# Patient Record
Sex: FEMALE | Race: WHITE | NOT HISPANIC OR LATINO | Employment: PART TIME | URBAN - METROPOLITAN AREA
[De-identification: names, ages, dates, MRNs, and addresses within clinical notes are randomized per-mention and may not be internally consistent; named-entity substitution may affect disease eponyms.]

---

## 2024-10-01 ENCOUNTER — HOSPITAL ENCOUNTER (EMERGENCY)
Facility: HOSPITAL | Age: 29
Discharge: HOME | End: 2024-10-02
Attending: EMERGENCY MEDICINE
Payer: COMMERCIAL

## 2024-10-01 ENCOUNTER — APPOINTMENT (OUTPATIENT)
Dept: GENERAL RADIOLOGY | Facility: HOSPITAL | Age: 29
End: 2024-10-01
Payer: COMMERCIAL

## 2024-10-01 DIAGNOSIS — K30 ACID INDIGESTION: ICD-10-CM

## 2024-10-01 DIAGNOSIS — R00.2 PALPITATIONS: Primary | ICD-10-CM

## 2024-10-01 DIAGNOSIS — R07.89 CHEST PRESSURE: ICD-10-CM

## 2024-10-01 DIAGNOSIS — R42 DIZZINESS: ICD-10-CM

## 2024-10-01 DIAGNOSIS — Z86.2 HISTORY OF ANEMIA: ICD-10-CM

## 2024-10-01 DIAGNOSIS — Z86.69 HISTORY OF MIGRAINE: ICD-10-CM

## 2024-10-01 LAB
ALBUMIN SERPL-MCNC: 4.7 G/DL (ref 3.5–5.2)
ALBUMIN/GLOB SERPL: 1.7 G/DL
ALP SERPL-CCNC: 87 U/L (ref 39–117)
ALT SERPL W P-5'-P-CCNC: 15 U/L (ref 1–33)
ANION GAP SERPL CALCULATED.3IONS-SCNC: 10 MMOL/L (ref 5–15)
AST SERPL-CCNC: 19 U/L (ref 1–32)
BASOPHILS # BLD AUTO: 0.03 10*3/MM3 (ref 0–0.2)
BASOPHILS NFR BLD AUTO: 0.3 % (ref 0–1.5)
BILIRUB SERPL-MCNC: 0.3 MG/DL (ref 0–1.2)
BUN SERPL-MCNC: 10 MG/DL (ref 6–20)
BUN/CREAT SERPL: 14.9 (ref 7–25)
CALCIUM SPEC-SCNC: 9.3 MG/DL (ref 8.6–10.5)
CHLORIDE SERPL-SCNC: 107 MMOL/L (ref 98–107)
CO2 SERPL-SCNC: 27 MMOL/L (ref 22–29)
CREAT SERPL-MCNC: 0.67 MG/DL (ref 0.57–1)
DEPRECATED RDW RBC AUTO: 43 FL (ref 37–54)
EGFRCR SERPLBLD CKD-EPI 2021: 120.8 ML/MIN/1.73
EOSINOPHIL # BLD AUTO: 0.25 10*3/MM3 (ref 0–0.4)
EOSINOPHIL NFR BLD AUTO: 2.3 % (ref 0.3–6.2)
ERYTHROCYTE [DISTWIDTH] IN BLOOD BY AUTOMATED COUNT: 13 % (ref 12.3–15.4)
FLUAV RNA RESP QL NAA+PROBE: NOT DETECTED
FLUBV RNA RESP QL NAA+PROBE: NOT DETECTED
GLOBULIN UR ELPH-MCNC: 2.7 GM/DL
GLUCOSE SERPL-MCNC: 115 MG/DL (ref 65–99)
HCG INTACT+B SERPL-ACNC: <0.1 MIU/ML
HCT VFR BLD AUTO: 40.9 % (ref 34–46.6)
HGB BLD-MCNC: 13.6 G/DL (ref 12–15.9)
HOLD SPECIMEN: NORMAL
IMM GRANULOCYTES # BLD AUTO: 0.02 10*3/MM3 (ref 0–0.05)
IMM GRANULOCYTES NFR BLD AUTO: 0.2 % (ref 0–0.5)
LYMPHOCYTES # BLD AUTO: 2.61 10*3/MM3 (ref 0.7–3.1)
LYMPHOCYTES NFR BLD AUTO: 24.5 % (ref 19.6–45.3)
MAGNESIUM SERPL-MCNC: 2.3 MG/DL (ref 1.6–2.6)
MCH RBC QN AUTO: 29.8 PG (ref 26.6–33)
MCHC RBC AUTO-ENTMCNC: 33.3 G/DL (ref 31.5–35.7)
MCV RBC AUTO: 89.7 FL (ref 79–97)
MONOCYTES # BLD AUTO: 0.77 10*3/MM3 (ref 0.1–0.9)
MONOCYTES NFR BLD AUTO: 7.2 % (ref 5–12)
NEUTROPHILS NFR BLD AUTO: 6.99 10*3/MM3 (ref 1.7–7)
NEUTROPHILS NFR BLD AUTO: 65.5 % (ref 42.7–76)
NRBC BLD AUTO-RTO: 0 /100 WBC (ref 0–0.2)
NT-PROBNP SERPL-MCNC: <36 PG/ML (ref 0–450)
PLATELET # BLD AUTO: 222 10*3/MM3 (ref 140–450)
PMV BLD AUTO: 11.6 FL (ref 6–12)
POTASSIUM SERPL-SCNC: 4.3 MMOL/L (ref 3.5–5.2)
PROT SERPL-MCNC: 7.4 G/DL (ref 6–8.5)
RBC # BLD AUTO: 4.56 10*6/MM3 (ref 3.77–5.28)
SARS-COV-2 RNA RESP QL NAA+PROBE: NOT DETECTED
SODIUM SERPL-SCNC: 144 MMOL/L (ref 136–145)
TROPONIN T SERPL HS-MCNC: <6 NG/L
TSH SERPL DL<=0.05 MIU/L-ACNC: 2.79 UIU/ML (ref 0.27–4.2)
WBC NRBC COR # BLD AUTO: 10.67 10*3/MM3 (ref 3.4–10.8)
WHOLE BLOOD HOLD COAG: NORMAL
WHOLE BLOOD HOLD SPECIMEN: NORMAL

## 2024-10-01 PROCEDURE — 99284 EMERGENCY DEPT VISIT MOD MDM: CPT

## 2024-10-01 PROCEDURE — 71045 X-RAY EXAM CHEST 1 VIEW: CPT

## 2024-10-01 PROCEDURE — 83735 ASSAY OF MAGNESIUM: CPT | Performed by: EMERGENCY MEDICINE

## 2024-10-01 PROCEDURE — 83880 ASSAY OF NATRIURETIC PEPTIDE: CPT | Performed by: EMERGENCY MEDICINE

## 2024-10-01 PROCEDURE — 84443 ASSAY THYROID STIM HORMONE: CPT | Performed by: EMERGENCY MEDICINE

## 2024-10-01 PROCEDURE — 93005 ELECTROCARDIOGRAM TRACING: CPT | Performed by: EMERGENCY MEDICINE

## 2024-10-01 PROCEDURE — 85025 COMPLETE CBC W/AUTO DIFF WBC: CPT | Performed by: EMERGENCY MEDICINE

## 2024-10-01 PROCEDURE — 96374 THER/PROPH/DIAG INJ IV PUSH: CPT

## 2024-10-01 PROCEDURE — 84484 ASSAY OF TROPONIN QUANT: CPT | Performed by: EMERGENCY MEDICINE

## 2024-10-01 PROCEDURE — 36415 COLL VENOUS BLD VENIPUNCTURE: CPT

## 2024-10-01 PROCEDURE — 25810000003 SODIUM CHLORIDE 0.9 % SOLUTION: Performed by: PHYSICIAN ASSISTANT

## 2024-10-01 PROCEDURE — 80053 COMPREHEN METABOLIC PANEL: CPT | Performed by: EMERGENCY MEDICINE

## 2024-10-01 PROCEDURE — 84702 CHORIONIC GONADOTROPIN TEST: CPT | Performed by: PHYSICIAN ASSISTANT

## 2024-10-01 PROCEDURE — 87636 SARSCOV2 & INF A&B AMP PRB: CPT | Performed by: PHYSICIAN ASSISTANT

## 2024-10-01 RX ORDER — FAMOTIDINE 10 MG/ML
20 INJECTION, SOLUTION INTRAVENOUS ONCE
Status: COMPLETED | OUTPATIENT
Start: 2024-10-01 | End: 2024-10-01

## 2024-10-01 RX ORDER — SODIUM CHLORIDE 0.9 % (FLUSH) 0.9 %
10 SYRINGE (ML) INJECTION AS NEEDED
Status: DISCONTINUED | OUTPATIENT
Start: 2024-10-01 | End: 2024-10-02 | Stop reason: HOSPADM

## 2024-10-01 RX ORDER — ALUMINA, MAGNESIA, AND SIMETHICONE 2400; 2400; 240 MG/30ML; MG/30ML; MG/30ML
30 SUSPENSION ORAL ONCE
Status: COMPLETED | OUTPATIENT
Start: 2024-10-01 | End: 2024-10-01

## 2024-10-01 RX ADMIN — FAMOTIDINE 20 MG: 10 INJECTION, SOLUTION INTRAVENOUS at 23:05

## 2024-10-01 RX ADMIN — ALUMINUM HYDROXIDE, MAGNESIUM HYDROXIDE, DIMETHICONE 30 ML: 400; 400; 40 SUSPENSION ORAL at 23:05

## 2024-10-01 RX ADMIN — SODIUM CHLORIDE 1000 ML: 9 INJECTION, SOLUTION INTRAVENOUS at 21:23

## 2024-10-02 VITALS
HEART RATE: 76 BPM | HEIGHT: 68 IN | TEMPERATURE: 98.5 F | DIASTOLIC BLOOD PRESSURE: 70 MMHG | WEIGHT: 150 LBS | SYSTOLIC BLOOD PRESSURE: 116 MMHG | BODY MASS INDEX: 22.73 KG/M2 | RESPIRATION RATE: 18 BRPM | OXYGEN SATURATION: 99 %

## 2024-10-02 NOTE — DISCHARGE INSTRUCTIONS
ER workup was reassuring.  EKG and telemetry during the ER course did not reveal any cardiac arrhythmia.  EKG showed normal sinus rhythm and cardiac troponin was normal.  Thyroid study, TSH, was normal.  CBC and chemistries revealed normal white blood cell count and normal electrolytes, both kidney function and normal potassium, sodium, and magnesium.  Patient tested negative for COVID-19 and influenza.  Chest x-ray was normal.  Recommend increase fluids, rest, no caffeine.  No over-the-counter herbal medications.  Rx for omeprazole 20 mg by mouth twice daily for acid indigestion will be prescribed.  We will refer patient closely to our cardiac event monitor clinic for further assessment.  Return to the ER if worsening symptoms.

## 2024-10-02 NOTE — ED PROVIDER NOTES
"Subjective   History of Present Illness  This is a healthy 29-year-old female that presents to the ER with palpitations and some chest pressure that started suddenly at 1823.  Patient reports history of waxing and waning palpitations.  She says that in the past, these episodes have only lasted less than 5 seconds.  The episode this evening happened at rest while patient was playing with her children and her dog.  It lasted 60 seconds and she also had some substernal and left parasternal pressure.  She reported sensation like her heart was doing \"flips and flutters\".  She also felt a little dizzy.  She denied any shortness of breath or near syncope/syncope.  She denies personal history of cardiac arrhythmia or congenital heart murmur.  Patient denies any lower extremity pedal edema.  Patient says that she has been seen by her PCP in the past for this and wore a Holter monitor for approximately 1 week in August, 2024 without any abnormalities.  Patient also had a normal echocardiogram on 9/5/2024.  Patient denies personal history of thyroid disease.  She does have past medical history of gestational diabetes, anemia, and migraine headaches.  LMP: IUD.  Patient denies any recent symptoms of illness.  She rarely drinks caffeine.  She denies any over-the-counter herbal supplements or stimulant use.  Patient denies smoking or vaping history.  No other concerns at this time.    History provided by:  Patient  Palpitations  Palpitations quality: Pt feels like her heart \"flips\" and flutters.  Onset quality:  Sudden  Duration:  60 seconds (episode lasted at 1823.)  Timing:  Constant  Progression:  Unchanged  Chronicity:  Chronic (6 months)  Context: not anxiety, not appetite suppressants, not caffeine, not dehydration, not nicotine and not stimulant use    Context comment:  Palpitations today lasting 60 seconds with chest pressure and dizziness. Pt wore holter monitor through PCP for 1 week, but no known abnormalities. Echo " normal from 9/5/24.  Relieved by:  Nothing  Worsened by:  Nothing  Ineffective treatments:  None tried  Associated symptoms: chest pressure (substernal and left parasternal.) and dizziness    Associated symptoms: no back pain, no chest pain, no cough, no diaphoresis, no hemoptysis, no leg pain, no lower extremity edema, no malaise/fatigue, no nausea, no near-syncope, no numbness, no orthopnea, no PND, no shortness of breath, no syncope, no vomiting and no weakness    Risk factors: no hx of thyroid disease and no OTC sinus medications    Risk factors comment:  Pt was on Omnicef for a skin infection and finished last week.      Review of Systems   Constitutional: Negative.  Negative for activity change, appetite change, chills, diaphoresis and malaise/fatigue.   HENT: Negative.  Negative for congestion, ear pain, postnasal drip, rhinorrhea, sinus pressure, sinus pain, sneezing and sore throat.    Eyes: Negative.  Negative for visual disturbance.   Respiratory: Negative.  Positive for chest tightness. Negative for cough, hemoptysis and shortness of breath.    Cardiovascular:  Positive for palpitations. Negative for chest pain, orthopnea, leg swelling, syncope, PND and near-syncope.   Gastrointestinal: Negative.  Negative for abdominal pain, nausea and vomiting.   Genitourinary: Negative.  Negative for dysuria, flank pain, frequency and urgency.        LMP: IUD.   Musculoskeletal: Negative.  Negative for back pain.   Skin: Negative.  Negative for color change and rash.   Neurological:  Positive for dizziness. Negative for syncope, weakness, light-headedness, numbness and headaches.   All other systems reviewed and are negative.      Past Medical History:   Diagnosis Date    Anemia in pregnancy     Fever blister     Gestational diabetes     CHECK BS 4X/DAY- GESTATIONAL    Hyperemesis gravidarum     Migraine     Post partum depression     Scoliosis     Urinary tract infection        Allergies   Allergen Reactions     Chlorhexidine Hives and Itching    Latex Rash     Clear tape as well       Past Surgical History:   Procedure Laterality Date     SECTION       SECTION N/A 2021    Procedure:  SECTION REPEAT;  Surgeon: Shara Denny DO;  Location: [facility] LABOR DELIVERY;  Service: Obstetrics/Gynecology;  Laterality: N/A;    KNEE ARTHROSCOPY Left     TOOTH EXTRACTION         Family History   Problem Relation Age of Onset    Skin cancer Mother     No Known Problems Father     Diabetes Maternal Grandmother     Diabetes Paternal Grandmother        Social History     Socioeconomic History    Marital status:      Spouse name: Timbo Burch    Number of children: 1   Tobacco Use    Smoking status: Never    Smokeless tobacco: Never   Vaping Use    Vaping status: Never Used   Substance and Sexual Activity    Alcohol use: Not Currently     Comment: OCC WHEN NOT PREG    Drug use: No    Sexual activity: Yes     Partners: Male           Objective   Physical Exam  Vitals and nursing note reviewed.   Constitutional:       General: She is not in acute distress.     Appearance: Normal appearance. She is not ill-appearing, toxic-appearing or diaphoretic.      Comments: Patient resting comfortably.  No acute sign of pain or distress.  Nontoxic   HENT:      Head: Normocephalic and atraumatic.      Nose: Nose normal. No congestion or rhinorrhea.      Mouth/Throat:      Mouth: Mucous membranes are moist.      Pharynx: Oropharynx is clear. No pharyngeal swelling, oropharyngeal exudate or posterior oropharyngeal erythema.      Comments: Oral mucous membranes are moist.  Posterior pharynx is not erythematous  Eyes:      Extraocular Movements: Extraocular movements intact.      Conjunctiva/sclera: Conjunctivae normal.      Pupils: Pupils are equal, round, and reactive to light.   Neck:      Thyroid: No thyromegaly.      Comments: No thyromegaly  Cardiovascular:      Rate and Rhythm: Normal rate and regular rhythm. No  extrasystoles are present.     Pulses: Normal pulses.      Heart sounds: Normal heart sounds. No murmur heard.     Comments: Regular rate and rhythm.  No ectopy.  No murmurs appreciated.  No pedal edema to lower extremities  Pulmonary:      Effort: Pulmonary effort is normal.      Breath sounds: Normal breath sounds.      Comments: Lungs are clear to auscultation bilaterally  Abdominal:      General: Bowel sounds are normal. There is no distension.      Palpations: Abdomen is soft.      Tenderness: There is no abdominal tenderness. There is no right CVA tenderness, left CVA tenderness, guarding or rebound.      Comments: Abdomen soft and nontender.  Active bowel sounds all 4 quadrants   Musculoskeletal:         General: Normal range of motion.      Cervical back: Normal range of motion and neck supple.      Right lower leg: No edema.      Left lower leg: No edema.   Skin:     General: Skin is warm and dry.   Neurological:      General: No focal deficit present.      Mental Status: She is alert and oriented to person, place, and time.      Cranial Nerves: Cranial nerves 2-12 are intact.      Sensory: Sensation is intact.      Motor: Motor function is intact.      Coordination: Coordination is intact.      Comments: Alert and oriented x 3.  Neuro intact and nonfocal   Psychiatric:         Mood and Affect: Mood and affect normal.         Speech: Speech normal.         Behavior: Behavior normal. Behavior is cooperative.         Thought Content: Thought content normal.         Cognition and Memory: Cognition and memory normal.         Judgment: Judgment normal.      Comments: Normal mood and affect.  Patient does not appear anxious.         Procedures           ED Course  ED Course as of 10/01/24 2322   e Oct 01, 2024   2050 Echo 9/5/24. EF 58% and normal heart valves from reviewing Echo in Epic.   [FC]   0155 I personally interpreted EKG which showed sinus rhythm.  No evidence of ectopy, arrhythmia, or ischemia.  I  also personally interpreted chest x-ray which showed no acute cardiopulmonary process.  CBC and chemistries were within normal limits.  BUN and creatinine were 10 and 0.67 and LFTs were normal.  Magnesium is 2.3.  TSH was normal.  Quant hCG is negative.  High-sensitivity troponin is less than 6.  Respiratory panel was negative for COVID-19 and influenza.  Vital signs were stable throughout the ER course and heart rate has been in the 60s to 70s without evidence of ectopy or arrhythmia.  Last echocardiogram was recent on 9/5/2024 and EF was 58% and there were no abnormal heart valves.  Upon reassessment, patient says palpitations have not recurred, but she is now having acid indigestion.  She denies personal history of GERD.  She said that she had Mediterranean food for supper, including a Greek salad and hummus.  We will refer her to our cardiac event monitor clinic.  She needs to increase fluids and avoid caffeine.  No over-the-counter herbal supplements.  We will prescribe omeprazole 20 mg by mouth twice daily.  Avoid eating late after 6 PM.  ER workup is reassuring.  Return to the ER if worsening symptoms. [FC]   2319 Patient is not anemic.  H&H is 13 and 40. [FC]   2322 When inquiring about acid indigestion, patient said that she had previously been taking increased amounts of ibuprofen for headaches.  She has not taken many NSAIDs over the last couple of weeks, however.  Differential diagnoses for acid indigestion could be GERD, gastritis, or peptic ulcer disease.  If symptoms persist, patient may need to see GI to consider upper endoscopy.  We will try omeprazole for now on discharge. [FC]      ED Course User Index  [FC] Анна Hudson PA-C                                           Recent Results (from the past 24 hour(s))   ECG 12 Lead ED Triage Standing Order; Dysrhythmia    Collection Time: 10/01/24  8:45 PM   Result Value Ref Range    QT Interval 424 ms    QTC Interval 454 ms   Comprehensive Metabolic  Panel    Collection Time: 10/01/24  9:01 PM    Specimen: Blood   Result Value Ref Range    Glucose 115 (H) 65 - 99 mg/dL    BUN 10 6 - 20 mg/dL    Creatinine 0.67 0.57 - 1.00 mg/dL    Sodium 144 136 - 145 mmol/L    Potassium 4.3 3.5 - 5.2 mmol/L    Chloride 107 98 - 107 mmol/L    CO2 27.0 22.0 - 29.0 mmol/L    Calcium 9.3 8.6 - 10.5 mg/dL    Total Protein 7.4 6.0 - 8.5 g/dL    Albumin 4.7 3.5 - 5.2 g/dL    ALT (SGPT) 15 1 - 33 U/L    AST (SGOT) 19 1 - 32 U/L    Alkaline Phosphatase 87 39 - 117 U/L    Total Bilirubin 0.3 0.0 - 1.2 mg/dL    Globulin 2.7 gm/dL    A/G Ratio 1.7 g/dL    BUN/Creatinine Ratio 14.9 7.0 - 25.0    Anion Gap 10.0 5.0 - 15.0 mmol/L    eGFR 120.8 >60.0 mL/min/1.73   Magnesium    Collection Time: 10/01/24  9:01 PM    Specimen: Blood   Result Value Ref Range    Magnesium 2.3 1.6 - 2.6 mg/dL   Single High Sensitivity Troponin T    Collection Time: 10/01/24  9:01 PM    Specimen: Blood   Result Value Ref Range    HS Troponin T <6 <14 ng/L   TSH    Collection Time: 10/01/24  9:01 PM    Specimen: Blood   Result Value Ref Range    TSH 2.790 0.270 - 4.200 uIU/mL   BNP    Collection Time: 10/01/24  9:01 PM    Specimen: Blood   Result Value Ref Range    proBNP <36.0 0.0 - 450.0 pg/mL   Green Top (Gel)    Collection Time: 10/01/24  9:01 PM   Result Value Ref Range    Extra Tube Hold for add-ons.    Lavender Top    Collection Time: 10/01/24  9:01 PM   Result Value Ref Range    Extra Tube hold for add-on    Gold Top - SST    Collection Time: 10/01/24  9:01 PM   Result Value Ref Range    Extra Tube Hold for add-ons.    Gray Top    Collection Time: 10/01/24  9:01 PM   Result Value Ref Range    Extra Tube Hold for add-ons.    Light Blue Top    Collection Time: 10/01/24  9:01 PM   Result Value Ref Range    Extra Tube Hold for add-ons.    CBC Auto Differential    Collection Time: 10/01/24  9:01 PM    Specimen: Blood   Result Value Ref Range    WBC 10.67 3.40 - 10.80 10*3/mm3    RBC 4.56 3.77 - 5.28 10*6/mm3     Hemoglobin 13.6 12.0 - 15.9 g/dL    Hematocrit 40.9 34.0 - 46.6 %    MCV 89.7 79.0 - 97.0 fL    MCH 29.8 26.6 - 33.0 pg    MCHC 33.3 31.5 - 35.7 g/dL    RDW 13.0 12.3 - 15.4 %    RDW-SD 43.0 37.0 - 54.0 fl    MPV 11.6 6.0 - 12.0 fL    Platelets 222 140 - 450 10*3/mm3    Neutrophil % 65.5 42.7 - 76.0 %    Lymphocyte % 24.5 19.6 - 45.3 %    Monocyte % 7.2 5.0 - 12.0 %    Eosinophil % 2.3 0.3 - 6.2 %    Basophil % 0.3 0.0 - 1.5 %    Immature Grans % 0.2 0.0 - 0.5 %    Neutrophils, Absolute 6.99 1.70 - 7.00 10*3/mm3    Lymphocytes, Absolute 2.61 0.70 - 3.10 10*3/mm3    Monocytes, Absolute 0.77 0.10 - 0.90 10*3/mm3    Eosinophils, Absolute 0.25 0.00 - 0.40 10*3/mm3    Basophils, Absolute 0.03 0.00 - 0.20 10*3/mm3    Immature Grans, Absolute 0.02 0.00 - 0.05 10*3/mm3    nRBC 0.0 0.0 - 0.2 /100 WBC   hCG, Quantitative, Pregnancy    Collection Time: 10/01/24  9:01 PM    Specimen: Blood   Result Value Ref Range    HCG Quantitative <0.10 mIU/mL   COVID-19 and FLU A/B PCR, 1 HR TAT - Swab, Nasopharynx    Collection Time: 10/01/24  9:17 PM    Specimen: Nasopharynx; Swab   Result Value Ref Range    COVID19 Not Detected Not Detected - Ref. Range    Influenza A PCR Not Detected Not Detected    Influenza B PCR Not Detected Not Detected     Note: In addition to lab results from this visit, the labs listed above may include labs taken at another facility or during a different encounter within the last 24 hours. Please correlate lab times with ED admission and discharge times for further clarification of the services performed during this visit.    XR Chest 1 View   Final Result   No acute cardiopulmonary findings.         Electronically Signed: Christ Eason MD     10/1/2024 9:14 PM EDT     Workstation ID: [id number]        Vitals:    10/01/24 2038 10/01/24 2130 10/01/24 2230   BP: 111/67 115/72 113/70   Pulse: 77 60 71   Resp: 18     Temp: 98.5 °F (36.9 °C)     TempSrc: Oral     SpO2: 100% 100% 99%   Weight: 68 kg (150 lb)    "  Height: 172.7 cm (68\")       Medications   sodium chloride 0.9 % flush 10 mL (has no administration in time range)   sodium chloride 0.9 % flush 10 mL (has no administration in time range)   sodium chloride 0.9 % bolus 1,000 mL (0 mL Intravenous Stopped 10/1/24 2306)   famotidine (PEPCID) injection 20 mg (20 mg Intravenous Given 10/1/24 2305)   aluminum-magnesium hydroxide-simethicone (MAALOX MAX) 400-400-40 MG/5ML suspension 30 mL (30 mL Oral Given 10/1/24 2305)     ECG/EMG Results (last 24 hours)       Procedure Component Value Units Date/Time    ECG 12 Lead ED Triage Standing Order; Dysrhythmia [[id number]] Collected: 10/01/24 2045     Updated: 10/01/24 2045     QT Interval 424 ms      QTC Interval 454 ms     Narrative:      Test Reason : ED Triage Standing Order~  Blood Pressure :   */*   mmHG  Vent. Rate :  69 BPM     Atrial Rate :  69 BPM     P-R Int : 154 ms          QRS Dur : 102 ms      QT Int : 424 ms       P-R-T Axes :  50  76  56 degrees     QTc Int : 454 ms    ** Poor data quality, interpretation may be adversely affected  Normal sinus rhythm  Possible Left atrial enlargement  Incomplete right bundle branch block  Borderline ECG  When compared with ECG of 21-SEP-2022 00:50,  No significant change was found    Referred By:            Confirmed By:           ECG 12 Lead ED Triage Standing Order; Dysrhythmia   Preliminary Result   Test Reason : ED Triage Standing Order~   Blood Pressure :   */*   mmHG   Vent. Rate :  69 BPM     Atrial Rate :  69 BPM      P-R Int : 154 ms          QRS Dur : 102 ms       QT Int : 424 ms       P-R-T Axes :  50  76  56 degrees      QTc Int : 454 ms      ** Poor data quality, interpretation may be adversely affected   Normal sinus rhythm   Possible Left atrial enlargement   Incomplete right bundle branch block   Borderline ECG   When compared with ECG of 21-SEP-2022 00:50,   No significant change was found      Referred By:            Confirmed By:               Medical " Decision Making  Amount and/or Complexity of Data Reviewed  Labs: ordered.  Radiology: ordered.  ECG/medicine tests: ordered.    Risk  OTC drugs.  Prescription drug management.        Final diagnoses:   Palpitations   Chest pressure   Acid indigestion   History of anemia   Dizziness   History of migraine       ED Disposition  ED Disposition       ED Disposition   Discharge    Condition   Stable    Comment   --               [facility] CARDIOLOGY  [address]  [address]  [facility] [id number] [phone]  Call in 1 day  Call tomorrow for close recheck    Jaun Ang MD  [address] [facility] [address]  [place] [address]  [phone]    Schedule an appointment as soon as possible for a visit in 2 days  Close PCP follow-up for recheck    [facility] EMERGENCY DEPARTMENT  [address] [facility] [id number] [phone]    If symptoms worsen         Medication List        New Prescriptions      omeprazole 20 MG capsule  Commonly known as: priLOSEC  Take 1 capsule by mouth 2 (Two) Times a Day.               Where to Get Your Medications        These medications were sent to [id number] - [place], [place] - [address] - [phone]  - [phone] FX  [address], [facility] [address]      Phone: [phone]   omeprazole 20 MG capsule            Анна Hudson PA-C  10/01/24 0817

## 2024-10-03 LAB
QT INTERVAL: 424 MS
QTC INTERVAL: 454 MS

## 2024-10-07 ENCOUNTER — OFFICE VISIT (OUTPATIENT)
Dept: NEUROLOGY | Facility: CLINIC | Age: 29
End: 2024-10-07
Payer: COMMERCIAL

## 2024-10-07 VITALS
OXYGEN SATURATION: 99 % | BODY MASS INDEX: 22.88 KG/M2 | DIASTOLIC BLOOD PRESSURE: 64 MMHG | SYSTOLIC BLOOD PRESSURE: 112 MMHG | HEART RATE: 85 BPM | HEIGHT: 68 IN | WEIGHT: 151 LBS

## 2024-10-07 DIAGNOSIS — G43.109 MIGRAINE WITH AURA AND WITHOUT STATUS MIGRAINOSUS, NOT INTRACTABLE: Primary | ICD-10-CM

## 2024-10-07 PROCEDURE — 99214 OFFICE O/P EST MOD 30 MIN: CPT | Performed by: NURSE PRACTITIONER

## 2024-10-07 RX ORDER — RIMEGEPANT SULFATE 75 MG/75MG
TABLET, ORALLY DISINTEGRATING ORAL
COMMUNITY

## 2024-10-07 RX ORDER — IBUPROFEN 800 MG/1
400 TABLET, FILM COATED ORAL EVERY 8 HOURS PRN
COMMUNITY

## 2024-10-07 RX ORDER — RIZATRIPTAN BENZOATE 10 MG/1
10 TABLET ORAL ONCE AS NEEDED
Qty: 30 TABLET | Refills: 2 | Status: SHIPPED | OUTPATIENT
Start: 2024-10-07 | End: 2025-10-07

## 2024-10-07 RX ORDER — PROPRANOLOL HCL 10 MG
10 TABLET ORAL 2 TIMES DAILY
Qty: 60 TABLET | Refills: 11 | Status: SHIPPED | OUTPATIENT
Start: 2024-10-07 | End: 2025-10-07

## 2024-10-07 NOTE — PROGRESS NOTES
Neuro Office Visit      Encounter Date: 10/07/2024   Patient Name: Arlet Gray  : 1995   MRN: [id number]   PCP:  Juan Ang MD     Chief Complaint:    Chief Complaint   Patient presents with    Headache     Right sided       History of Present Illness: Arlet Gray is a 29 y.o. female who is here today in Neurology for migraine.  History of Present Illness  She has been experiencing migraines since the onset of her menstrual cycle in middle school, which have intensified over the past year, occurring almost weekly.     The severity of her migraines varies, with some lasting at least 24 hours, followed by a period of feeling unwell, foggy, and fatigued for at least 1 day.     She experiences an aura before the migraine, characterized by tunnel vision and blurred peripheral vision, typically lasting 30 minutes to an hour prior to the onset of migraine pain.     The pain is localized behind her right eye and is described as an ice pick sensation extending from her eyes to the back of her head. Accompanying symptoms include light and sound sensitivity, nausea, vomiting, dizziness, and lightheadedness.    She has not found any medication that alleviates her symptoms, and sleep seems to be the only relief.     She has been prescribed Nurtec but has not yet taken it. She has not been on any preventative medication for her migraines.     Her migraines were more frequent during college due to stress, which she identifies as a trigger.     She currently has an IUD and was previously prescribed Imitrex, which dulled but did not eliminate her migraines.     She does not recall any significant side effects from sumatriptan.    She also struggles with anxiety.     She occasionally experiences sharp, brief headaches when standing up quickly or changing positions, which radiate to the front of her head.     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          Subjective      Past Medical History:   Past Medical History:   Diagnosis Date    Anemia in pregnancy     Fever blister     Gestational diabetes     CHECK BS 4X/DAY- GESTATIONAL    Hyperemesis gravidarum     Migraine     Post partum depression     Scoliosis     Urinary tract infection        Past Surgical History:   Past Surgical History:   Procedure Laterality Date     SECTION       SECTION N/A 2021    Procedure:  SECTION REPEAT;  Surgeon: Shara Denny DO;  Location: [facility] LABOR DELIVERY;  Service: Obstetrics/Gynecology;  Laterality: N/A;    D & C AND LAPAROSCOPY  2022    KNEE ARTHROSCOPY Left     SPLENECTOMY Bilateral 2022    TOOTH EXTRACTION         Family History:   Family History   Problem Relation Age of Onset    Skin cancer Mother     Migraines Mother     Migraines Father     Diabetes Maternal Grandmother     Dementia Maternal Grandmother     Migraines Maternal Grandfather     Migraines Paternal Grandmother     Diabetes Paternal Grandmother        Social History:   Social History     Socioeconomic History    Marital status:      Spouse name: Timbo Burch    Number of children: 1   Tobacco Use    Smoking status: Never    Smokeless tobacco: Never   Vaping Use    Vaping status: Never Used   Substance and Sexual Activity    Alcohol use: Not Currently     Comment: OCC WHEN NOT PREG    Drug use: No    Sexual activity: Yes     Partners: Male     Birth control/protection: I.U.D., Bilateral salpingectomy        Medications:     Current Outpatient Medications:     acyclovir (ZOVIRAX) 400 MG tablet, Take 1 tablet by mouth As Needed (FEVER BLISTER). As needed, Disp: , Rfl:     omeprazole (priLOSEC) 20 MG capsule, Take 1 capsule by mouth 2 (Two) Times a Day., Disp: 60 capsule, Rfl: 0    Rimegepant Sulfate (Nurtec) 75 MG tablet dispersible tablet, Take  by mouth. Sample, not started yet, Disp: , Rfl:     ibuprofen  (ADVIL,MOTRIN) 800 MG tablet, Take 0.5 tablets by mouth Every 8 (Eight) Hours As Needed., Disp: , Rfl:     propranolol (INDERAL) 10 MG tablet, Take 1 tablet by mouth 2 (Two) Times a Day., Disp: 60 tablet, Rfl: 11    rizatriptan (Maxalt) 10 MG tablet, Take 1 tablet by mouth 1 (One) Time As Needed for Migraine. May repeat in 2 hours if needed, Disp: 30 tablet, Rfl: 2    Allergies:   Allergies   Allergen Reactions    Chlorhexidine Hives and Itching    Latex Rash     Clear tape as well       PHQ-9 Total Score:     STEADI Fall Risk Assessment has not been completed.    Objective     Physical Exam:     Neurological Exam  Mental Status  Awake, alert and oriented to person, place and time. Recent and remote memory are intact. Speech is normal. Language is fluent with no aphasia. Attention and concentration are normal. Fund of knowledge is appropriate for level of education.    Cranial Nerves  CN II: Visual acuity is normal.  CN III, IV, VI: Extraocular movements intact bilaterally. Pupils equal round and reactive to light bilaterally.  CN V: Facial sensation is normal.  CN VII: Full and symmetric facial movement.  CN IX, X: Palate elevates symmetrically  CN XI: Shoulder shrug strength is normal.  CN XII: Tongue midline without atrophy or fasciculations.    Motor  Normal muscle bulk throughout. No fasciculations present. Normal muscle tone. Strength is 5/5 throughout all four extremities.    Sensory  Sensation is intact to light touch, pinprick, vibration and proprioception in all four extremities.    Reflexes                                            Right                      Left  Brachioradialis                    2+                         2+  Biceps                                 2+                         2+  Triceps                                2+                         2+  Finger flex                           2+                         2+  Hamstring                            2+                          "2+  Patellar                                2+                         2+  Achilles                                2+                         2+    Coordination    Finger-to-nose, rapid alternating movements and heel-to-shin normal bilaterally without dysmetria.    Gait  Normal casual, toe, heel and tandem gait.        Vital Signs:   Vitals:    10/07/24 1358   BP: 112/64   Pulse: 85   SpO2: 99%   Weight: 68.5 kg (151 lb)   Height: 172.7 cm (68\")     Body mass index is 22.96 kg/m².     Results:   Results       Imaging:   XR Chest 1 View    Result Date: 10/1/2024  No acute cardiopulmonary findings. Electronically Signed: Christ Eason MD  10/1/2024 9:14 PM EDT  Workstation ID: [id number]       Labs:   No results found for: \"CMP\", \"PROTEIN\", \"ANTIMOGAB\", \"IHYGHB2KZDV\", \"JCVRESULT\", \"QUANTTBGOLD\", \"CBCDIF\", \"IGGALBSER\"     Assessment / Plan      Assessment/Plan:   Diagnoses and all orders for this visit:    1. Migraine with aura and without status migrainosus, not intractable (Primary)  -     MRI Brain With & Without Contrast; Future    Other orders  -     propranolol (INDERAL) 10 MG tablet; Take 1 tablet by mouth 2 (Two) Times a Day.  Dispense: 60 tablet; Refill: 11  -     rizatriptan (Maxalt) 10 MG tablet; Take 1 tablet by mouth 1 (One) Time As Needed for Migraine. May repeat in 2 hours if needed  Dispense: 30 tablet; Refill: 2         Assessment & Plan  1. Migraines.  Propranolol 10 mg twice a day will be initiated as a preventive medication, considering its efficacy in treating both migraines and anxiety. She is advised to be cautious with positional changes due to potential orthostatic hypotension. Rizatriptan will be prescribed as an abortive medication, given her previous use of sumatriptan without significant side effects. If rizatriptan is ineffective or causes adverse effects, Nurtec will be considered. An MRI will be pursued to rule out any underlying issues, especially given the new sharp, transient " headaches she experiences when changing positions.     Follow-up  Return in 3 months for follow up.    Patient Education:     Reviewed medications, potential side effects and signs and symptoms to report. Discussed risk versus benefits of treatment plan with patient and/or family-including medications, labs and radiology that may be ordered. Addressed questions and concerns during visit. Patient and/or family verbalized understanding and agree with plan. Instructed to call the office with any questions and report to ER with any life-threatening symptoms.     Follow Up:   Return in about 3 months (around 1/7/2025).    I spent 40 minutes caring for Arlet on this date of service. This time includes time spent by me in the following activities: preparing for the visit, performing a medically appropriate examination and/or evaluation, counseling and educating the patient/family/caregiver, documenting information in the medical record, ordering medications, and ordering test(s).        During this visit the following were done:  Labs Reviewed []    Labs Ordered []    Radiology Reports Reviewed []    Radiology Ordered [x]    PCP Records Reviewed []    Referring Provider Records Reviewed []    ER Records Reviewed []    Hospital Records Reviewed []    History Obtained From Family []    Radiology Images Reviewed []    Other Reviewed []    Records Requested []      Patient or patient representative verbalized consent for the use of Ambient Listening during the visit with  DARRELL Hammond for chart documentation. 10/7/2024  16:08 EDT    DARRELL Hammond   [facility] NEUROLOGY  [address] [place] [address]  [facility] [address]  [phone]

## 2024-10-16 NOTE — PROGRESS NOTES
"[facility]  Heart and Valve Clinic    PCP: Juan Ang MD    Referring provider: Анна Hudson PA-C      Chief Complaint  Chest Pain and Palpitations    Subjective      History of Present Illness     Arlet Gray, 29 y.o. female with no prior cardiac history who presents today for Chest Pain and Palpitations    Patient presented to the ED on 10/1 with complaints of palpitations and chest pressure that started suddenly that evening.  Symptoms occurred while playing with her children and her dog.  It lasted about 60 seconds and had some slight dizziness.  She reports a history of some intermittent palpitations in the past, but usually these are more brief.  She wore a Holter in August that was ordered by her PCP.  She had a normal echo on 9/5.  Workup in the ER was benign.    She reports palpitations started back in January when started on Wellbutrin but persisted despite discontinuing. She says they are random and says there are 2 types. One is a flipping sensation that goes away when she coughs and another one feels like a \"flutter\" and is fast and followed by chest heaviness and fatigue. Longest episode was 1-2 minutes. Palpitations are worse laying down. Not usually when she is active. 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 She has 2 young children, one with medical issues. She does have anxiety. Notes worsening migraines as well.  Prescribed propranolol for her migraines but has not started this yet.  Chest pressure is currently pleuritic, currently on steroids for allergies and cough    She wore a holter in July with her PCP which showed rare PACs/PVCs, one 3 beat run of NSVT.  Triggered events were PACs, sinus tachycardia.  She did not have any significant symptoms while wearing.     Objective     Vital Signs:   Vitals:    10/17/24 1517 10/17/24 1520   BP: 113/57 115/64   BP Location: Left arm Left arm   Patient Position: Sitting " "Standing   Pulse: 77 89   SpO2: 99%    Weight: 69.4 kg (153 lb)    Height: 172.7 cm (68\")      Body mass index is 23.26 kg/m².  Physical Exam  Vitals reviewed.   Constitutional:       Appearance: Normal appearance.   HENT:      Head: Normocephalic.   Neck:      Vascular: No carotid bruit.   Cardiovascular:      Rate and Rhythm: Normal rate and regular rhythm.      Pulses: Normal pulses.      Heart sounds: Normal heart sounds, S1 normal and S2 normal. No murmur heard.  Pulmonary:      Effort: Pulmonary effort is normal. No respiratory distress.      Breath sounds: Normal breath sounds.   Chest:      Chest wall: No tenderness.   Abdominal:      General: Abdomen is flat.      Palpations: Abdomen is soft.   Musculoskeletal:      Cervical back: Neck supple.      Right lower leg: No edema.      Left lower leg: No edema.   Skin:     General: Skin is warm and dry.   Neurological:      General: No focal deficit present.      Mental Status: She is alert and oriented to person, place, and time. Mental status is at baseline.   Psychiatric:         Mood and Affect: Mood normal.         Behavior: Behavior normal.         Thought Content: Thought content normal.              Data Reviewed:{ Labs  Result Review  Imaging  Med Tab  Media :23}   COVID PRE-OP / PRE-PROCEDURE SCREENING ORDER (NO ISOLATION) - Swab, Nasopharynx (10/01/2024 21:17)  hCG, Quantitative, Pregnancy (10/01/2024 21:01)  BNP (10/01/2024 21:01)  TSH (10/01/2024 21:01)  Single High Sensitivity Troponin T (10/01/2024 21:01)  Magnesium (10/01/2024 21:01)  Comprehensive Metabolic Panel (10/01/2024 21:01)  CBC & Differential (10/01/2024 21:01)  Adult Transthoracic Echo Complete W/ Cont if Necessary Per Protocol (09/05/2024 08:41)  ECG 12 Lead ED Triage Standing Order; Dysrhythmia (10/01/2024 20:45)    Assessment & Plan   Assessment and Plan    1. Palpitation  -Possible symptomatic PVCs and/or PACs.  Discussed with patient. Encouraged stress reduction, regular " exercise, adequate sleep and healthy diet  -Okay to start propranolol, but would preferably wait until after Holter monitor so that we can evaluate the symptoms  -Normal echo  - Holter Monitor - 72 Hour Up To 15 Days; Future    2. Chest pressure  -Pleuritic in nature in the setting of recent allergy exacerbation and cough  -Normal echo          Follow Up   Return if symptoms worsen or fail to improve.          Patient was given instructions and counseling regarding her condition or for health maintenance advice. Please see specific information pulled into the AVS if appropriate.  Patient was instructed to call the Heart and Valve Center with any questions, concerns, or worsening symptoms.    Dictated Utilizing Dragon Dictation   Please note that portions of this note were completed with a voice recognition program.   Part of this note may be an electronic transcription/translation of spoken language to printed text using the Dragon Dictation System.

## 2024-10-17 ENCOUNTER — HOSPITAL ENCOUNTER (OUTPATIENT)
Dept: CARDIOLOGY | Facility: HOSPITAL | Age: 29
Discharge: HOME | End: 2024-10-17
Admitting: NURSE PRACTITIONER
Payer: COMMERCIAL

## 2024-10-17 ENCOUNTER — HOSPITAL ENCOUNTER (OUTPATIENT)
Dept: CARDIOLOGY | Facility: HOSPITAL | Age: 29
Discharge: HOME | End: 2024-10-17
Payer: COMMERCIAL

## 2024-10-17 ENCOUNTER — OFFICE VISIT (OUTPATIENT)
Dept: CARDIOLOGY | Facility: HOSPITAL | Age: 29
End: 2024-10-17
Payer: COMMERCIAL

## 2024-10-17 VITALS
HEART RATE: 89 BPM | DIASTOLIC BLOOD PRESSURE: 64 MMHG | SYSTOLIC BLOOD PRESSURE: 115 MMHG | HEIGHT: 68 IN | BODY MASS INDEX: 23.19 KG/M2 | OXYGEN SATURATION: 99 % | WEIGHT: 153 LBS

## 2024-10-17 DIAGNOSIS — R00.2 PALPITATION: Primary | ICD-10-CM

## 2024-10-17 DIAGNOSIS — R00.2 PALPITATION: ICD-10-CM

## 2024-10-17 DIAGNOSIS — R07.89 CHEST PRESSURE: ICD-10-CM

## 2024-10-17 PROCEDURE — 93246 EXT ECG>7D<15D RECORDING: CPT

## 2024-10-17 NOTE — PROGRESS NOTES
[facility] Heart Monitor Documentation    Arlet Gray  1995  [id number]  10/17/24      [] ZIO XT Patch  Model [id number] Prescribed for  Days    Serial Number: (N + 9 Digits) N   Apply-By Date on Box:   [employer] Tracking Number:   [employer] Tracking        [] [employer] MINI PLUS Mobile Cardiac Telemetry  Model BGMINIPLUS Prescribed for  Days    Serial Number: (BGM + 7 Digits) BGM  Shipped-By Date on Box:   [employer] Tracking Number: [id number]  UPS Tracking      [x] [employer] MINI Holter Monitor  Model BGMINIEL Prescribed for 14 Days    Serial Number: (7 Digits) [id number]  Shipped-By Date on Box:   [employer] Tracking Number: [id number]  U[employer] Tracking        This monitor was applied to the patient's chest and checked for proper functioning.  Ms. Arlet Gray was instructed in the proper use of this monitor.  She was given the opportunity to ask questions and left the office with the device 's instruction manual.    Gisela Thayer MA, 16:25 EDT, 10/17/24                  [facility]

## 2024-10-23 ENCOUNTER — HOSPITAL ENCOUNTER (OUTPATIENT)
Dept: MRI IMAGING | Facility: HOSPITAL | Age: 29
Discharge: HOME | End: 2024-10-23
Admitting: NURSE PRACTITIONER
Payer: COMMERCIAL

## 2024-10-23 DIAGNOSIS — G43.109 MIGRAINE WITH AURA AND WITHOUT STATUS MIGRAINOSUS, NOT INTRACTABLE: ICD-10-CM

## 2024-10-23 PROCEDURE — 70553 MRI BRAIN STEM W/O & W/DYE: CPT

## 2024-10-23 PROCEDURE — A9577 INJ MULTIHANCE: HCPCS | Performed by: NURSE PRACTITIONER

## 2024-10-23 PROCEDURE — 0 GADOBENATE DIMEGLUMINE 529 MG/ML SOLUTION: Performed by: NURSE PRACTITIONER

## 2024-10-23 RX ADMIN — GADOBENATE DIMEGLUMINE 15 ML: 529 INJECTION, SOLUTION INTRAVENOUS at 18:23

## 2024-12-03 ENCOUNTER — TELEPHONE (OUTPATIENT)
Dept: CARDIOLOGY | Facility: HOSPITAL | Age: 29
End: 2024-12-03
Payer: COMMERCIAL

## 2024-12-03 NOTE — TELEPHONE ENCOUNTER
Left message for patient to call my direct line back to discuss her upcoming appointment and her holter monitor as we still do not have results back.

## 2024-12-04 NOTE — TELEPHONE ENCOUNTER
Returned patient call regarding my missed call, she stated her  had returned the monitor 2 weeks ago she was pretty sure it was to a [employer] but she is going to speak to him and assure that it was a [employer] site he took it to and see if he's got a tracking number as the company website does not sure that it was ever dropped off to a [employer].     Patient is fine to cancel this monitor all together and start a new one over in case this one is not found.

## 2025-03-11 ENCOUNTER — OFFICE VISIT (OUTPATIENT)
Dept: NEUROLOGY | Facility: CLINIC | Age: 30
End: 2025-03-11
Payer: COMMERCIAL

## 2025-03-11 VITALS
SYSTOLIC BLOOD PRESSURE: 116 MMHG | DIASTOLIC BLOOD PRESSURE: 72 MMHG | WEIGHT: 154 LBS | OXYGEN SATURATION: 96 % | HEIGHT: 68 IN | HEART RATE: 79 BPM | BODY MASS INDEX: 23.34 KG/M2

## 2025-03-11 DIAGNOSIS — G43.109 MIGRAINE WITH AURA AND WITHOUT STATUS MIGRAINOSUS, NOT INTRACTABLE: Primary | ICD-10-CM

## 2025-03-11 PROCEDURE — 99214 OFFICE O/P EST MOD 30 MIN: CPT | Performed by: NURSE PRACTITIONER

## 2025-03-11 RX ORDER — ALBUTEROL SULFATE 90 UG/1
2 INHALANT RESPIRATORY (INHALATION) 4 TIMES DAILY PRN
COMMUNITY
Start: 2025-02-10

## 2025-03-11 RX ORDER — ATOGEPANT 60 MG/1
60 TABLET ORAL DAILY
Qty: 4 TABLET | Refills: 0 | COMMUNITY
Start: 2025-03-11 | End: 2025-03-13

## 2025-03-11 NOTE — PROGRESS NOTES
Neuro Office Visit      Encounter Date: 2025   Patient Name: Arlet Gray  : 1995   MRN: [id number]   PCP:  Juan Ang MD     Chief Complaint:    Chief Complaint   Patient presents with    Migraine       History of Present Illness: Arlet Gray is a 29 y.o. female who is here today in Neurology for headache.    Last visit with me on 10/7/2024, MRI brain ordered,  started propranolol and rizatriptan.  History of Present Illness  A slight improvement in migraine symptoms is reported, despite not yet starting propranolol due to ongoing cardiac monitoring by her cardiologist. A follow-up appointment with the cardiologist is scheduled in the coming weeks.     Treatment with rizatriptan was attempted but proved ineffective.     An increase in non-migraine headaches is noted, occurring multiple times a week and varying in duration.     These headaches are described as sharp pains that resolve upon changing positions and dull aches that can persist throughout the day if untreated. Ibuprofen has been discontinued due to gastrointestinal side effects, including heartburn.     Nurtec has been found effective for migraines but not for regular headaches.     Migraines have decreased in frequency to approximately twice a month. Stress management techniques have been implemented, and she has completely abstained from alcohol, which was identified as a trigger for arrhythmia.     Coffee has also been eliminated from the diet, another identified trigger. Black tea sometimes induces headaches but not migraines.     Recently, misaligned eyes were discovered, which may contribute to headaches. NeuroLens glasses and contact lenses for astigmatism have been prescribed. Imitrex was used for years.    SOCIAL HISTORY  Hobbies: Rock climbing  Alcohol: Stopped drinking alcohol completely  Coffee/Tea/Caffeine-containing Drinks: Coffee is a trigger and has been cut out; black tea sometimes  causes headaches      MEDICATIONS  CURRENT MEDS:  Rizatriptan  PREVIOUS MEDS:  Ibuprofen  Reason for Discontinuation: Messed with stomach and caused heartburn  Nurtec  Imitrex      Subjective      Past Medical History:   Past Medical History:   Diagnosis Date    Anemia in pregnancy     Asthma Whole life    Acute asthma    Fever blister     Gestational diabetes     CHECK BS 4X/DAY- GESTATIONAL    Headache, tension-type     Hyperemesis gravidarum     Migraine     Post partum depression     Scoliosis     Urinary tract infection        Past Surgical History:   Past Surgical History:   Procedure Laterality Date     SECTION       SECTION N/A 2021    Procedure:  SECTION REPEAT;  Surgeon: Shara Denny DO;  Location: [facility] LABOR DELIVERY;  Service: Obstetrics/Gynecology;  Laterality: N/A;    D & C AND LAPAROSCOPY  2022    KNEE ARTHROSCOPY Left     SPLENECTOMY Bilateral 2022    TOOTH EXTRACTION         Family History:   Family History   Problem Relation Age of Onset    Skin cancer Mother     Migraines Mother     Asthma Mother     Migraines Father     Diabetes Maternal Grandmother     Dementia Maternal Grandmother     Migraines Maternal Grandfather     Migraines Paternal Grandmother     Diabetes Paternal Grandmother     Hypertension Paternal Grandfather        Social History:   Social History     Socioeconomic History    Marital status:      Spouse name: Timbo Burch    Number of children: 1   Tobacco Use    Smoking status: Never    Smokeless tobacco: Never   Vaping Use    Vaping status: Never Used   Substance and Sexual Activity    Alcohol use: Not Currently     Comment: OCC WHEN NOT PREG    Drug use: No    Sexual activity: Yes     Partners: Male     Birth control/protection: I.U.D., Bilateral salpingectomy        Medications:     Current Outpatient Medications:     acyclovir (ZOVIRAX) 400 MG tablet, Take 1 tablet by mouth As Needed (FEVER BLISTER). As needed, Disp: , Rfl:      albuterol sulfate  (90 Base) MCG/ACT inhaler, Inhale 2 puffs 4 (Four) Times a Day As Needed., Disp: , Rfl:     HYDROXYZINE HCL PO, Take  by mouth As Needed., Disp: , Rfl:     omeprazole (priLOSEC) 20 MG capsule, Take 1 capsule by mouth 2 (Two) Times a Day., Disp: 60 capsule, Rfl: 0    Atogepant (Qulipta) 60 MG tablet, Take 1 tablet by mouth Daily., Disp: 4 tablet, Rfl: 0    rimegepant sulfate ODT (Nurtec) 75 MG disintegrating tablet, Place 1 tablet under the tongue As Needed (Migraine)., Disp: 2 tablet, Rfl: 0    Allergies:   Allergies   Allergen Reactions    Chlorhexidine Hives and Itching    Latex Rash     Clear tape as well       PHQ-9 Total Score:     BÁRBARA Fall Risk Assessment has not been completed.    Objective     Physical Exam:     Neurological Exam  Mental Status  Awake, alert and oriented to person, place and time. Recent and remote memory are intact. Speech is normal. Language is fluent with no aphasia. Attention and concentration are normal. Fund of knowledge is appropriate for level of education.    Cranial Nerves  CN II: Visual acuity is normal.  CN III, IV, VI: Extraocular movements intact bilaterally. Pupils equal round and reactive to light bilaterally.  CN V: Facial sensation is normal.  CN VII: Full and symmetric facial movement.  CN IX, X: Palate elevates symmetrically  CN XI: Shoulder shrug strength is normal.  CN XII: Tongue midline without atrophy or fasciculations.    Motor  Normal muscle bulk throughout. No fasciculations present. Normal muscle tone. Strength is 5/5 throughout all four extremities.    Sensory  Sensation is intact to light touch, pinprick, vibration and proprioception in all four extremities.    Reflexes                                            Right                      Left  Brachioradialis                    2+                         2+  Biceps                                 2+                         2+  Triceps                                2+                "          2+  Finger flex                           2+                         2+  Hamstring                            2+                         2+  Patellar                                2+                         2+  Achilles                                2+                         2+    Coordination    Finger-to-nose, rapid alternating movements and heel-to-shin normal bilaterally without dysmetria.    Gait  Normal casual, toe, heel and tandem gait.        Vital Signs:   Vitals:    03/11/25 0934   BP: 116/72   Pulse: 79   SpO2: 96%   Weight: 69.9 kg (154 lb)   Height: 172.7 cm (68\")     Body mass index is 23.42 kg/m².     Results:   Results    Radiology: Brain MRI, normal impression     Imaging:   No Images in the past 120 days found..     Labs:   No results found for: \"CMP\", \"PROTEIN\", \"ANTIMOGAB\", \"RRAEXU8XPEL\", \"JCVRESULT\", \"QUANTTBGOLD\", \"CBCDIF\", \"IGGALBSER\"     Assessment / Plan      Assessment/Plan:   Diagnoses and all orders for this visit:    1. Migraine with aura and without status migrainosus, not intractable (Primary)  Comments:  Start Qulipta, sample given  Nurtec samples given    Other orders  -     Atogepant (Qulipta) 60 MG tablet; Take 1 tablet by mouth Daily.  Dispense: 4 tablet; Refill: 0  -     rimegepant sulfate ODT (Nurtec) 75 MG disintegrating tablet; Place 1 tablet under the tongue As Needed (Migraine).  Dispense: 2 tablet; Refill: 0         Assessment & Plan  1. Migraines.  The patient reports that her migraines have improved, now occurring approximately once or twice a month. She has not started propranolol due to ongoing heart monitoring by her cardiologist. Rizatriptan was tried but did not work. She has experienced normal headaches, which could be stress-related or tension headaches, occurring multiple times a week. She has cut out alcohol and coffee, which were identified as triggers. She has used Nurtec effectively for migraines but not for regular headaches. MRI of the brain " showed no abnormalities. A prescription for Qulipta will be issued, to be taken daily, with the primary side effect being constipation. Samples of Qulipta will be provided. A request for prior authorization for Nurtec will be submitted to her insurance company. If the insurance company does not approve the prior authorization, Elavil will be considered as an alternative treatment option.    Follow-up  The patient is scheduled for a follow-up visit in 6 months.    Patient Education:     Reviewed medications, potential side effects and signs and symptoms to report. Discussed risk versus benefits of treatment plan with patient and/or family-including medications, labs and radiology that may be ordered. Addressed questions and concerns during visit. Patient and/or family verbalized understanding and agree with plan. Instructed to call the office with any questions and report to ER with any life-threatening symptoms.     Follow Up:   Return in about 6 months (around 9/11/2025).    I spent 30 minutes caring for Arlet on this date of service. This time includes time spent by me in the following activities: preparing for the visit, performing a medically appropriate examination and/or evaluation, counseling and educating the patient/family/caregiver, and documenting information in the medical record.        During this visit the following were done:  Labs Reviewed []    Labs Ordered []    Radiology Reports Reviewed [x]    Radiology Ordered []    PCP Records Reviewed []    Referring Provider Records Reviewed []    ER Records Reviewed []    Hospital Records Reviewed []    History Obtained From Family []    Radiology Images Reviewed [x]    Other Reviewed []    Records Requested []      Patient or patient representative verbalized consent for the use of Ambient Listening during the visit with  DARRELL Hammond for chart documentation. 3/11/2025  09:53 EDT    DARRELL Hammond   [facility]  [facility] MEDICAL [facility] NEUROLOGY  [address]  [place] [id number] [phone]

## 2025-03-13 ENCOUNTER — SPECIALTY PHARMACY (OUTPATIENT)
Dept: ONCOLOGY | Facility: HOSPITAL | Age: 30
End: 2025-03-13
Payer: COMMERCIAL

## 2025-03-13 RX ORDER — ATOGEPANT 60 MG/1
60 TABLET ORAL DAILY
Qty: 30 TABLET | Refills: 11 | Status: SHIPPED | OUTPATIENT
Start: 2025-03-13

## 2025-03-14 NOTE — PROGRESS NOTES
Specialty Pharmacy First fill Coordination Note     Arlet is a 29 y.o. female contacted today regarding refills of her specialty medication(s).    Specialty medication(s) and dose(s) confirmed: qulipta 60 mg po qday; nurtec 75 mg po prn  Changes to medications: no  Changes to insurance: no  Reviewed and verified with patient:  Allergies  Meds  Problems             Delivery Questions      Flowsheet Row Most Recent Value   Delivery method [employer]   Delivery address verified with patient/caregiver? Yes   Delivery address Home   Number of medications in delivery 2   Medication(s) being filled and delivered Atogepant (Qulipta), Rimegepant Sulfate (NURTEC-ODT)   Doses left of specialty medications some samples   Copay verified? Yes   Copay amount $0 for each   Copay form of payment No copayment ($0)   Delivery Date Selection 03/18/25   Signature Required No                 Follow-up: 4 week(s)     Jason Hu, PharmD  3/14/2025   13:48 EDT

## 2025-03-14 NOTE — PROGRESS NOTES
Specialty Pharmacy Patient Management Program  Neurology Initial Assessment     Arlet Gray is a 29 y.o. female with migraines seen by a Neurology provider and enrolled in the Neurology Patient Management program offered by [facility].  An initial outreach was conducted, including assessment of therapy appropriateness and specialty medication education for qulipta 60 mg po daily and nurtec 75 mg po prn. The patient was introduced to services offered by [facility], including: regular assessments, refill coordination, curbside pick-up or mail order delivery options, prior authorization maintenance, and financial assistance programs as applicable. The patient was also provided with contact information for the pharmacy team.     Insurance Coverage & Financial Support  [employer], express scripts with copay cards    Relevant Past Medical History and Comorbidities  Relevant medical history and concomitant health conditions were discussed with the patient. The patient's chart has been reviewed for relevant past medical history and comorbid health conditions and updated as necessary.   Past Medical History:   Diagnosis Date    Anemia in pregnancy     Asthma Whole life    Acute asthma    Fever blister     Gestational diabetes     CHECK BS 4X/DAY- GESTATIONAL    Headache, tension-type     Hyperemesis gravidarum     Migraine     Post partum depression     Scoliosis     Urinary tract infection      Social History     Socioeconomic History    Marital status:      Spouse name: Timbo Burch    Number of children: 1   Tobacco Use    Smoking status: Never    Smokeless tobacco: Never   Vaping Use    Vaping status: Never Used   Substance and Sexual Activity    Alcohol use: Not Currently     Comment: OCC WHEN NOT PREG    Drug use: No    Sexual activity: Yes     Partners: Male     Birth control/protection: I.U.D., Bilateral salpingectomy      Problem list reviewed by  Jason Hu, PharmD on 3/14/2025 at  1:36 PM    Allergies  Known allergies and reactions were discussed with the patient. The patient's chart has been reviewed for  allergy information and updated as necessary.   Allergies   Allergen Reactions    Chlorhexidine Hives and Itching    Latex Rash     Clear tape as well     Allergies reviewed by Jason Hu, PharmD on 3/14/2025 at  1:30 PM    Relevant Laboratory Values  Common labs          10/1/2024    21:01   Common Labs   Glucose 115    BUN 10    Creatinine 0.67    Sodium 144    Potassium 4.3    Chloride 107    Calcium 9.3    Albumin 4.7    Total Bilirubin 0.3    Alkaline Phosphatase 87    AST (SGOT) 19    ALT (SGPT) 15    WBC 10.67    Hemoglobin 13.6    Hematocrit 40.9    Platelets 222        Lab Assessment   The above labs have been reviewed. No dose adjustments are needed for the specialty medication(s) based on the labs.     Current Medication List  This medication list has been reviewed with the patient and evaluated for any interactions or necessary modifications/recommendations, and updated to include all prescription medications, OTC medications, and supplements the patient is currently taking.  This list reflects what is contained in the patient's profile, which has also been marked as reviewed to communicate to other providers it is the most up to date version of the patient's current medication therapy.     Current Outpatient Medications:     acyclovir (ZOVIRAX) 400 MG tablet, Take 1 tablet by mouth As Needed (FEVER BLISTER). As needed, Disp: , Rfl:     albuterol sulfate  (90 Base) MCG/ACT inhaler, Inhale 2 puffs 4 (Four) Times a Day As Needed., Disp: , Rfl:     Atogepant (Qulipta) 60 MG tablet, Take 1 tablet by mouth Daily., Disp: 30 tablet, Rfl: 11    HYDROXYZINE HCL PO, Take  by mouth As Needed. (Patient taking differently: Take  by mouth As Needed (panic attachs.).), Disp: , Rfl:     omeprazole (priLOSEC) 20 MG capsule, Take 1  capsule by mouth 2 (Two) Times a Day. (Patient taking differently: Take 1 capsule by mouth Daily As Needed.), Disp: 60 capsule, Rfl: 0    rimegepant sulfate ODT (Nurtec) 75 MG disintegrating tablet, Place 1 tablet under the tongue Daily As Needed (migraines). Take 1 tablet at the onset of headache, Max of 75 mg/24 hours, Max of 18 tabs/30 days., Disp: 16 tablet, Rfl: 11    Medicines reviewed by Jason Hu, PharmD on 3/14/2025 at  1:31 PM    Drug Interactions  No relevant drug drug interactions with specialty medication.       Initial Education Provided for Specialty Medication  The patient has been provided with the following education and any applicable administration techniques (i.e. self-injection) have been demonstrated for the therapies indicated. All questions and concerns have been addressed prior to the patient receiving the medication, and the patient has verbalized understanding of the education and any materials provided.  Additional patient education shall be provided and documented upon request by the patient, provider or payer.       Nurtec (rimegepant)  Medication Expectations   Why am I taking this medication? You are taking this medication for migraine prophylaxis or to treat an acute migraine.   What should I expect while on this medication? You should expect to see a decrease in the frequency and severity of your migraines.   How does the medication work? Nurtec is a monoclonal antibody that binds to calcitonin gene-related peptide (CGRP) and blocks its binding to the receptor decreasing the severity of migraines.   How long will I be on this medication for? The amount of time you will be on this medication will be determined by your doctor and your response to the medication.    How do I take this medication? Take as directed on your prescription label.   What are some possible side effects? Potential side effects including, but not limited to nausea. Pt verbalized understanding.    What happens if I miss a dose? Take the missed dose as soon as possible, and resume the every other day timed from the last dose..     Medication Safety   What are things I should warn my doctor immediately about? Hypersensitivity reactions - trouble breathing or swallowing.   What are things that I should be cautious of? Hypersensitivity reactions (eg, dyspnea, rash), including delayed serious reactions, have occurred; discontinue use if suspected    What are some medications that can interact with this one? Avoid concomitant administration of Nurtec ODT with strong inhibitors of CY, strong or moderate inducers of CYP3A or inhibitors of P-gp or BCRP. Avoid another dose of Nurtec ODT within 48 hours when it is administered with moderate inhibitors of CY.  Ask your pharmacist or health care provider before starting new medications     Medication Storage/Handling   How should I handle this medication? Keep this medication out of reach of pets/children in original container. Ensure hands are dry before opening blister pack.   How does this medication need to be stored? Store at room temperature away from heat/cold, sunlight or moisture   How should I dispose of this medication? There should not be a need to dispose of this medication unless your provider decides to change the dose or therapy. If that is the case, take to your local police station for proper disposal. Some pharmacies also have take-back bins for medication drop-off.      Resources/Support   How can I remind myself to take this medication? You can download reminder apps to help you manage your refills. You may also set an alarm on your phone to remind you. The pharmacy carries pill boxes that you can place next to an area you pass everyday (such as where you place your car keys or where you charge your phone)   Is financial support available?  Yes, [email or web address] can provide co-pay cards if you have commercial insurance or patient  assistance if you have Medicare or no insurance.    Which vaccines are recommended for me? Talk to your doctor about these vaccines: Flu, Coronavirus (COVID-19), Pneumococcal (pneumonia), Tdap, Hepatitis B, Zoster (shingles)      Atogepant (Qulipta)        Medication Expectations   Why am I taking this medication? You are taking this medication for migraine prophylaxis.   What should I expect while on this medication? You should expect to a decrease in the frequency and severity of your migraines.   How does the medication work? Qulipta is a monoclonal antibody that binds to calcitonin gene-related peptide (CGRP) and blocks its binding to the receptor decreasing the severity of migraines.   How long will I be on this medication for? The amount of time you will be on this medication will be determined by your doctor and your response to the medication.    How do I take this medication? Take as directed on your prescription label.   Take one tablet daily with or without food.   What are some possible side effects? Potential side effects including, but not limited to nausea, constipation and fatigue. Pt verbalized understanding.   What happens if I miss a dose? If you miss a dose of this medicine, take it as soon as possible. However, if it is almost time for your next dose, skip the missed dose and go back to your regular dosing schedule. Do not double doses.                  Medication Safety   What are things I should warn my doctor immediately about? Hypersensitivity reactions, such as trouble breathing or swallowing.   What are things that I should be cautious of? Be cautious driving until you know how this drug will affect you.   What are some medications that can interact with this one? Ketoconazole, Itraconazole, cyclosporin, clarithromycin, rifampin, carbamazepine, phenytion, Walker's Wort, efavirenz, and etravine.            Medication Storage/Handling   How should I handle this medication? Keep this  medication our of reach of pets/children in original container.   How does this medication need to be stored? Store at room temperature away from heat/cold, sunlight or moisture.   How should I dispose of this medication? There should not be a need to dispose of this medication unless your provider decides to change the dose or therapy. If that is the case, take to your local police station for proper disposal. Some pharmacies also have take-back bins for medication drop-off.             Resources/Support   How can I remind myself to take this medication? You can download reminder apps to help you manage your refills. You may also set an alarm on your phone to remind you. The pharmacy carries pill boxes that you can place next to an area you pass everyday (such as where you place your car keys or where you charge your phone)   Is financial support available?  Yes, [employer] can provide co-pay cards if you have commercial insurance or patient assistance if you have Medicare or no insurance.    Which vaccines are recommended for me? Talk to your doctor about these vaccines: Flu, Coronavirus (COVID-19), Pneumococcal (pneumonia), Tdap, Hepatitis B, Zoster (shingles)           Adherence and Self-Administration  Adherence related to the patient's specialty therapy was discussed with the patient. The Adherence segment of this outreach has been reviewed and updated.   Is there a concern with patient's ability to self administer the medication correctly and without issue?: No  Were any potential barriers to adherence identified during the initial assessment or patient education?: No  Are there any concerns regarding the patient's understanding of the importance of medication adherence?: No  Methods for Supporting Patient Adherence and/or Self-Administration: patient to report    Goals of Therapy  Goals related to the patient's specialty therapy were discussed with the patient. The Patient Goals segment of this outreach has been  reviewed and updated.   Goals Addressed Today        Specialty Pharmacy General Goal      On Average, Reduce:   Frequency of migraines to < 4 per month.   Symptom severity by 50 % within 2 hours of taking acute therapy.   Duration of migraines to <4 hours.     Baseline Values/Notes on Enrollment  Frequency: weekly  Symptom Severity:  moderate -severe  Duration: 24 hours    Date of Reassessment Notes on Progress Toward Above Goals   MM/DD/YY                                            Reassessment Plan & Follow-Up  Medication Therapy Changes: begin daily qulipta 60 mg and nurtec prn  Related Plans, Therapy Recommendations, or Therapy Problems to Be Addressed: n/a  Pharmacist to perform regular reassessments no more than (6) months from the previous assessment.  Care Coordinator to set up future refill outreaches, coordinate prescription delivery, and escalate clinical questions to pharmacist.   Welcome information and patient satisfaction survey to be sent by specialty pharmacy team with patient's initial fill.    Attestation  Therapeutic appropriateness: Appropriate   I attest the patient was actively involved in and has agreed to the above plan of care. If the prescribed therapy is at any point deemed not appropriate based on the current or future assessments, a consultation will be initiated with the patient's specialty care provider to determine the best course of action. The revised plan of therapy will be documented along with any additional patient education provided. Discussed aforementioned material with patient via telemedicine.    Jason Hu, PharmD, [facility]  Clinic Specialty Pharmacist, Neurology  3/14/2025  13:48 EDT

## 2025-03-26 ENCOUNTER — TELEPHONE (OUTPATIENT)
Dept: CARDIOLOGY | Facility: HOSPITAL | Age: 30
End: 2025-03-26
Payer: COMMERCIAL

## 2025-03-26 NOTE — TELEPHONE ENCOUNTER
"Pt called to report having an \"episode\" last night 3/25/2025- she reports experiencing chest tightness that radiated to her left shoulder and down her left arm, as well as indigestion.  Pt states she was unable to take a b/p but was able to check her HR and reports it was 50 bpm  Pt states she is no longer experiencing these symptoms but is concerned it may come back and would like to be seen by Ida for evaluation  RN instructed pt to go to local ER if she experiences these same symptoms between now and her appointment with Ida, that has been scheduled for Tuesday 4/1/2025 . Pt verbalized understanding       "

## 2025-03-27 NOTE — PROGRESS NOTES
"[facility]  [facility]    PCP: Juan Ang MD          Chief Complaint  Chest Pain    Problem List:   Palpitations  Holter 7/2024 with PCP showed rare PACs/PVCs, one 3 beat run of NSVT.  Triggered events were PACs, sinus tachycardia.  She did not have any significant symptoms while wearing.   Echo 9/2024 normal LVEF, normal valves  Holter x 13 days 10/2024 no significant arrhythmias.  Triggered events for sinus bradycardia, sinus tachycardia, PVCs, PACs and normal sinus rhythm  Chest pain      Subjective      History of Present Illness     Arlet Gray, 29 y.o. female who presents today for Chest Pain    She reports the other night she woke up with intense pressure on the left side of her neck and radiated to her left shoulder. Lasted for about 30 min. Tums did not relieve it. Had difficulty getting in a deep breath but denies pleuritic pain.  Reports heart rate was 50 on her Apple watch. She had some mild nausea and sweating. She is not sure if it was heartburn because it has gotten worse. She is on omeprazole. She does note some tightness in her chest when she runs or jumps on the trampoline with her children. Still has occasional palpitations, but has improved since she stopped drinking alcohol, cut back caffeine and started doing yoga    Maternal grandfather had CABG in his 60s, but no other family hx of premature CAD    Objective     Vital Signs:   Vitals:    04/01/25 0913   BP: 111/62   BP Location: Left arm   Patient Position: Sitting   Pulse: 66   SpO2: 98%   Weight: 68.5 kg (151 lb)   Height: 172.7 cm (68\")       Body mass index is 22.96 kg/m².  Physical Exam  Vitals reviewed.   Constitutional:       Appearance: Normal appearance.   HENT:      Head: Normocephalic.   Neck:      Vascular: No carotid bruit.   Cardiovascular:      Rate and Rhythm: Normal rate and regular rhythm.      Pulses: Normal pulses.      Heart sounds: Normal heart sounds, S1 normal and " S2 normal. No murmur heard.  Pulmonary:      Effort: Pulmonary effort is normal. No respiratory distress.      Breath sounds: Normal breath sounds.   Chest:      Chest wall: No tenderness.   Abdominal:      General: Abdomen is flat.      Palpations: Abdomen is soft.   Musculoskeletal:      Cervical back: Neck supple.      Right lower leg: No edema.      Left lower leg: No edema.   Skin:     General: Skin is warm and dry.   Neurological:      General: No focal deficit present.      Mental Status: She is alert and oriented to person, place, and time. Mental status is at baseline.   Psychiatric:         Mood and Affect: Mood normal.         Behavior: Behavior normal.         Thought Content: Thought content normal.                Data Reviewed:{ Labs  Result Review  Imaging  Med Tab  Media :23}   Holter Monitor - 72 Hour Up To 15 Days (01/08/2025 14:43)  Cardiology Scan (10/23/2024 00:00)  Adult Transthoracic Echo Complete W/ Cont if Necessary Per Protocol (09/05/2024 08:41)    Assessment & Plan   Assessment and Plan    1. Chest pain  -Low ASCVD risk, however, due to persistent exertional chest pain we will proceed with GXT  -We discussed potential causes of atypical chest pain and if GXT is normal would follow-up with PCP    2. Palpitation  -Benign Holter monitor in January.  History of symptomatic ectopy, which seems to be improving with lifestyle intervention        Follow Up   Return if symptoms worsen or fail to improve.          Patient was given instructions and counseling regarding her condition or for health maintenance advice. Please see specific information pulled into the AVS if appropriate.  Patient was instructed to call the [facility] with any questions, concerns, or worsening symptoms.    Dictated Utilizing Dragon Dictation   Please note that portions of this note were completed with a voice recognition program.   Part of this note may be an electronic transcription/translation of spoken  language to printed text using the Dragon Dictation System.

## 2025-04-01 ENCOUNTER — OFFICE VISIT (OUTPATIENT)
Dept: CARDIOLOGY | Facility: HOSPITAL | Age: 30
End: 2025-04-01
Payer: COMMERCIAL

## 2025-04-01 ENCOUNTER — HOSPITAL ENCOUNTER (OUTPATIENT)
Dept: CARDIOLOGY | Facility: HOSPITAL | Age: 30
Discharge: HOME | End: 2025-04-01
Payer: COMMERCIAL

## 2025-04-01 VITALS
SYSTOLIC BLOOD PRESSURE: 111 MMHG | DIASTOLIC BLOOD PRESSURE: 62 MMHG | WEIGHT: 151 LBS | OXYGEN SATURATION: 98 % | BODY MASS INDEX: 22.88 KG/M2 | HEIGHT: 68 IN | HEART RATE: 66 BPM

## 2025-04-01 DIAGNOSIS — R07.9 CHEST PAIN, UNSPECIFIED TYPE: Primary | ICD-10-CM

## 2025-04-01 DIAGNOSIS — R00.2 PALPITATION: ICD-10-CM

## 2025-04-01 DIAGNOSIS — R07.9 CHEST PAIN, UNSPECIFIED TYPE: ICD-10-CM

## 2025-04-01 LAB
QT INTERVAL: 412 MS
QTC INTERVAL: 441 MS

## 2025-04-01 PROCEDURE — 93005 ELECTROCARDIOGRAM TRACING: CPT | Performed by: NURSE PRACTITIONER

## 2025-04-09 ENCOUNTER — HOSPITAL ENCOUNTER (OUTPATIENT)
Facility: HOSPITAL | Age: 30
Discharge: HOME | End: 2025-04-09
Admitting: NURSE PRACTITIONER
Payer: COMMERCIAL

## 2025-04-09 DIAGNOSIS — R07.9 CHEST PAIN, UNSPECIFIED TYPE: ICD-10-CM

## 2025-04-09 DIAGNOSIS — R00.2 PALPITATION: ICD-10-CM

## 2025-04-09 LAB
BH CV STRESS BP STAGE 1: NORMAL
BH CV STRESS BP STAGE 2: NORMAL
BH CV STRESS BP STAGE 3: NORMAL
BH CV STRESS DURATION MIN STAGE 1: 3
BH CV STRESS DURATION MIN STAGE 2: 3
BH CV STRESS DURATION MIN STAGE 3: 3
BH CV STRESS DURATION MIN STAGE 4: 2
BH CV STRESS DURATION SEC STAGE 1: 0
BH CV STRESS DURATION SEC STAGE 2: 0
BH CV STRESS DURATION SEC STAGE 3: 0
BH CV STRESS DURATION SEC STAGE 4: 0
BH CV STRESS GRADE STAGE 1: 10
BH CV STRESS GRADE STAGE 2: 12
BH CV STRESS GRADE STAGE 3: 14
BH CV STRESS GRADE STAGE 4: 16
BH CV STRESS HR STAGE 1: 106
BH CV STRESS HR STAGE 2: 133
BH CV STRESS HR STAGE 3: 153
BH CV STRESS HR STAGE 4: 126
BH CV STRESS METS STAGE 1: 5
BH CV STRESS METS STAGE 2: 7.5
BH CV STRESS METS STAGE 3: 10
BH CV STRESS METS STAGE 4: 13.5
BH CV STRESS O2 STAGE 1: 96
BH CV STRESS O2 STAGE 2: 97
BH CV STRESS O2 STAGE 3: 98
BH CV STRESS O2 STAGE 4: 98
BH CV STRESS PROTOCOL 1: NORMAL
BH CV STRESS RECOVERY BP: NORMAL MMHG
BH CV STRESS RECOVERY HR: 107 BPM
BH CV STRESS RECOVERY O2: 96 %
BH CV STRESS SPEED STAGE 1: 1.7
BH CV STRESS SPEED STAGE 2: 2.5
BH CV STRESS SPEED STAGE 3: 3.4
BH CV STRESS SPEED STAGE 4: 4.2
BH CV STRESS STAGE 1: 1
BH CV STRESS STAGE 2: 2
BH CV STRESS STAGE 3: 3
BH CV STRESS STAGE 4: 4
MAXIMAL PREDICTED HEART RATE: 190 BPM
PERCENT MAX PREDICTED HR: 91.05 %
STRESS BASELINE BP: NORMAL MMHG
STRESS BASELINE HR: 78 BPM
STRESS O2 SAT REST: 96 %
STRESS PERCENT HR: 107 %
STRESS POST ESTIMATED WORKLOAD: 13.4 METS
STRESS POST EXERCISE DUR MIN: 11 MIN
STRESS POST EXERCISE DUR SEC: 0 SEC
STRESS POST O2 SAT PEAK: 98 %
STRESS POST PEAK BP: NORMAL MMHG
STRESS POST PEAK HR: 173 BPM
STRESS TARGET HR: 162 BPM

## 2025-04-09 PROCEDURE — 93017 CV STRESS TEST TRACING ONLY: CPT

## 2025-04-10 ENCOUNTER — TRANSCRIBE ORDERS (OUTPATIENT)
Dept: ADMINISTRATIVE | Facility: HOSPITAL | Age: 30
End: 2025-04-10
Payer: COMMERCIAL

## 2025-04-10 DIAGNOSIS — N64.4 MASTODYNIA: Primary | ICD-10-CM

## 2025-04-16 ENCOUNTER — SPECIALTY PHARMACY (OUTPATIENT)
Dept: ONCOLOGY | Facility: HOSPITAL | Age: 30
End: 2025-04-16
Payer: COMMERCIAL

## 2025-04-16 NOTE — PROGRESS NOTES
Specialty Pharmacy Patient Management Program  Refill Outreach     Arlet was contacted today regarding refills of their medication(s).    Refill Questions      Flowsheet Row Most Recent Value   Changes to allergies? No   Changes to medications? No   New conditions or infections since last clinic visit No   Unplanned office visit, urgent care, ED, or hospital admission in the last 4 weeks  No   How does patient/caregiver feel medication is working? Good   Financial problems or insurance changes  No   Since the previous refill, were any specialty medication doses or scheduled injections missed or delayed?  Yes   If yes, please provide the amount Nurtec have plenty   Why were doses missed? Nurtec prn   Does this patient require a clinical escalation to a pharmacist? No            Delivery Questions      Flowsheet Row Most Recent Value   Delivery method UPS   Delivery address verified with patient/caregiver? Yes   Delivery address Home   Number of medications in delivery 1   Medication(s) being filled and delivered Atogepant (Qulipta)   Doses left of specialty medications Qulipta has 2 tablets left.   Copay verified? Yes   Copay amount Qulipta = $0   Copay form of payment No copayment ($0)   Delivery Date Selection 04/18/25   Signature Required No   Do you consent to receive electronic handouts?  Yes                 Follow-up: 30 day(s)     Malvin Madsen  4/16/2025  14:40 EDT

## 2025-04-23 ENCOUNTER — HOSPITAL ENCOUNTER (OUTPATIENT)
Dept: MAMMOGRAPHY | Facility: HOSPITAL | Age: 30
Discharge: HOME | End: 2025-04-23
Payer: COMMERCIAL

## 2025-04-23 ENCOUNTER — HOSPITAL ENCOUNTER (OUTPATIENT)
Dept: ULTRASOUND IMAGING | Facility: HOSPITAL | Age: 30
Discharge: HOME | End: 2025-04-23
Payer: COMMERCIAL

## 2025-04-23 DIAGNOSIS — N64.4 MASTODYNIA: ICD-10-CM

## 2025-04-23 PROCEDURE — G0279 TOMOSYNTHESIS, MAMMO: HCPCS

## 2025-04-23 PROCEDURE — 77066 DX MAMMO INCL CAD BI: CPT

## 2025-04-23 PROCEDURE — 76642 ULTRASOUND BREAST LIMITED: CPT

## 2025-05-19 ENCOUNTER — SPECIALTY PHARMACY (OUTPATIENT)
Dept: ONCOLOGY | Facility: HOSPITAL | Age: 30
End: 2025-05-19
Payer: COMMERCIAL

## 2025-05-19 NOTE — PROGRESS NOTES
Specialty Pharmacy Patient Management Program  Refill Outreach     Arlet was contacted today regarding refills of their medication(s).    Refill Questions      Flowsheet Row Most Recent Value   Changes to allergies? No   Changes to medications? No   New conditions or infections since last clinic visit No   Unplanned office visit, urgent care, ED, or hospital admission in the last 4 weeks  No   How does patient/caregiver feel medication is working? Good   Financial problems or insurance changes  No   Since the previous refill, were any specialty medication doses or scheduled injections missed or delayed?  No   If yes, please provide the amount N/A   Why were doses missed? N/A   Does this patient require a clinical escalation to a pharmacist? No            Delivery Questions      Flowsheet Row Most Recent Value   Delivery method UPS   Delivery address verified with patient/caregiver? Yes   Delivery address Home   Other address preferred N/A   Number of medications in delivery 2   Medication(s) being filled and delivered Atogepant (Qulipta), Rimegepant Sulfate (NURTEC-ODT)   Doses left of specialty medications Qulipta and Nurtec has 2 tablets left   Copay verified? Yes   Copay amount Qulipta and Nurtec  = $0   Copay form of payment No copayment ($0)   Delivery Date Selection 05/20/25   Signature Required No   Do you consent to receive electronic handouts?  Yes                 Follow-up: 30 day(s)     Malvin Madsen  5/19/2025  12:24 EDT

## 2025-05-30 ENCOUNTER — TELEPHONE (OUTPATIENT)
Dept: NEUROLOGY | Facility: CLINIC | Age: 30
End: 2025-05-30
Payer: COMMERCIAL

## 2025-06-12 ENCOUNTER — SPECIALTY PHARMACY (OUTPATIENT)
Dept: ONCOLOGY | Facility: HOSPITAL | Age: 30
End: 2025-06-12
Payer: COMMERCIAL

## 2025-06-12 NOTE — PROGRESS NOTES
Specialty Pharmacy Patient Management Program  Refill Outreach     Arlet was contacted today regarding refills of their medication(s).    Refill Questions      Flowsheet Row Most Recent Value   Changes to allergies? No   Changes to medications? No   New conditions or infections since last clinic visit No   Unplanned office visit, urgent care, ED, or hospital admission in the last 4 weeks  No   How does patient/caregiver feel medication is working? Good   Financial problems or insurance changes  No   Since the previous refill, were any specialty medication doses or scheduled injections missed or delayed?  No   If yes, please provide the amount N/A   Why were doses missed? N/A   Does this patient require a clinical escalation to a pharmacist? No            Delivery Questions      Flowsheet Row Most Recent Value   Delivery method UPS   Delivery address verified with patient/caregiver? Yes   Delivery address Home   Other address preferred N/A   Number of medications in delivery 1   Medication(s) being filled and delivered Atogepant (Qulipta)   Doses left of specialty medications Qulipta a week left   Copay verified? Yes   Copay amount Qulipta =$0   Copay form of payment No copayment ($0)   Delivery Date Selection 06/13/25   Signature Required No   Do you consent to receive electronic handouts?  Yes                 Follow-up: 30 day(s)     Malvin Madsen  6/12/2025  11:52 EDT

## 2025-07-07 ENCOUNTER — SPECIALTY PHARMACY (OUTPATIENT)
Dept: ONCOLOGY | Facility: HOSPITAL | Age: 30
End: 2025-07-07
Payer: COMMERCIAL

## 2025-07-07 NOTE — PROGRESS NOTES
Specialty Pharmacy Patient Management Program  Refill Outreach     Arlet was contacted today regarding refills of their medication(s).    Refill Questions      Flowsheet Row Most Recent Value   Changes to allergies? No   Changes to medications? Yes  [patient stated that she initiated Meloxicam and Protonix two weeks ago]   New conditions or infections since last clinic visit No   Unplanned office visit, urgent care, ED, or hospital admission in the last 4 weeks  No   How does patient/caregiver feel medication is working? Good   Financial problems or insurance changes  No   Since the previous refill, were any specialty medication doses or scheduled injections missed or delayed?  No   If yes, please provide the amount n/a   Why were doses missed? n/a   Does this patient require a clinical escalation to a pharmacist? Yes  [patient stated that she initiated Meloxicam and Protonix two weeks ago]            Delivery Questions      Flowsheet Row Most Recent Value   Delivery method UPS   Delivery address verified with patient/caregiver? Yes   Delivery address Home   Other address preferred n/a   Number of medications in delivery 1   Medication(s) being filled and delivered Atogepant (Qulipta)   Doses left of specialty medications qulipta = 8 tabs   Copay verified? Yes   Copay amount no copay   Copay form of payment No copayment ($0)   Delivery Date Selection 07/08/25   Signature Required No   Do you consent to receive electronic handouts?  Yes                 Follow-up: 30 day(s)     Lamont Queen, Pharmacy Technician  7/7/2025  11:38 EDT

## 2025-07-08 RX ORDER — MELOXICAM 15 MG/1
15 TABLET ORAL DAILY
COMMUNITY

## 2025-07-08 RX ORDER — PANTOPRAZOLE SODIUM 40 MG/1
40 TABLET, DELAYED RELEASE ORAL DAILY
COMMUNITY

## 2025-08-11 ENCOUNTER — SPECIALTY PHARMACY (OUTPATIENT)
Dept: ONCOLOGY | Facility: HOSPITAL | Age: 30
End: 2025-08-11
Payer: COMMERCIAL

## 2025-08-12 ENCOUNTER — SPECIALTY PHARMACY (OUTPATIENT)
Facility: HOSPITAL | Age: 30
End: 2025-08-12
Payer: COMMERCIAL